# Patient Record
Sex: MALE | Race: WHITE | Employment: UNEMPLOYED | URBAN - METROPOLITAN AREA
[De-identification: names, ages, dates, MRNs, and addresses within clinical notes are randomized per-mention and may not be internally consistent; named-entity substitution may affect disease eponyms.]

---

## 2017-06-24 ENCOUNTER — HOSPITAL ENCOUNTER (EMERGENCY)
Age: 37
Discharge: LEFT BEFORE DISCHARGE | End: 2017-06-24
Attending: EMERGENCY MEDICINE
Payer: SELF-PAY

## 2017-06-24 ENCOUNTER — APPOINTMENT (OUTPATIENT)
Dept: GENERAL RADIOLOGY | Age: 37
End: 2017-06-24
Attending: PHYSICIAN ASSISTANT
Payer: SELF-PAY

## 2017-06-24 VITALS
SYSTOLIC BLOOD PRESSURE: 134 MMHG | BODY MASS INDEX: 21.19 KG/M2 | RESPIRATION RATE: 20 BRPM | TEMPERATURE: 98.3 F | WEIGHT: 135 LBS | DIASTOLIC BLOOD PRESSURE: 82 MMHG | HEART RATE: 95 BPM | OXYGEN SATURATION: 100 % | HEIGHT: 67 IN

## 2017-06-24 DIAGNOSIS — R52 BODY ACHES: ICD-10-CM

## 2017-06-24 DIAGNOSIS — R55 NEAR SYNCOPE: ICD-10-CM

## 2017-06-24 DIAGNOSIS — Z53.29 LEFT AGAINST MEDICAL ADVICE: ICD-10-CM

## 2017-06-24 DIAGNOSIS — S51.811A FOREARM LACERATION, RIGHT, INITIAL ENCOUNTER: ICD-10-CM

## 2017-06-24 DIAGNOSIS — T14.8XXA POSTTRAUMATIC WOUND INFECTION: ICD-10-CM

## 2017-06-24 DIAGNOSIS — R11.0 NAUSEA WITHOUT VOMITING: ICD-10-CM

## 2017-06-24 DIAGNOSIS — R07.9 CHEST PAIN, UNSPECIFIED TYPE: ICD-10-CM

## 2017-06-24 DIAGNOSIS — R51.9 HEADACHE, UNSPECIFIED HEADACHE TYPE: Primary | ICD-10-CM

## 2017-06-24 DIAGNOSIS — L08.9 POSTTRAUMATIC WOUND INFECTION: ICD-10-CM

## 2017-06-24 LAB
ALBUMIN SERPL BCP-MCNC: 4 G/DL (ref 3.4–5)
ALBUMIN/GLOB SERPL: 1.1 {RATIO} (ref 0.8–1.7)
ALP SERPL-CCNC: 74 U/L (ref 45–117)
ALT SERPL-CCNC: 30 U/L (ref 16–61)
AMPHET UR QL SCN: NEGATIVE
ANION GAP BLD CALC-SCNC: 8 MMOL/L (ref 3–18)
APAP SERPL-MCNC: <2 UG/ML (ref 10–30)
APPEARANCE UR: CLEAR
AST SERPL W P-5'-P-CCNC: 26 U/L (ref 15–37)
ATRIAL RATE: 83 BPM
BACTERIA URNS QL MICRO: ABNORMAL /HPF
BARBITURATES UR QL SCN: NEGATIVE
BASOPHILS # BLD AUTO: 0 K/UL (ref 0–0.06)
BASOPHILS # BLD: 0 % (ref 0–2)
BENZODIAZ UR QL: NEGATIVE
BILIRUB SERPL-MCNC: 0.4 MG/DL (ref 0.2–1)
BILIRUB UR QL: NEGATIVE
BUN SERPL-MCNC: 15 MG/DL (ref 7–18)
BUN/CREAT SERPL: 17 (ref 12–20)
CALCIUM SERPL-MCNC: 8.7 MG/DL (ref 8.5–10.1)
CALCULATED P AXIS, ECG09: 75 DEGREES
CALCULATED R AXIS, ECG10: 86 DEGREES
CALCULATED T AXIS, ECG11: 68 DEGREES
CANNABINOIDS UR QL SCN: POSITIVE
CHLORIDE SERPL-SCNC: 97 MMOL/L (ref 100–108)
CK MB CFR SERPL CALC: 1 % (ref 0–4)
CK MB SERPL-MCNC: 4 NG/ML (ref 5–25)
CK SERPL-CCNC: 393 U/L (ref 39–308)
CO2 SERPL-SCNC: 31 MMOL/L (ref 21–32)
COCAINE UR QL SCN: NEGATIVE
COLOR UR: YELLOW
CREAT SERPL-MCNC: 0.86 MG/DL (ref 0.6–1.3)
DIAGNOSIS, 93000: NORMAL
DIFFERENTIAL METHOD BLD: ABNORMAL
EOSINOPHIL # BLD: 0.2 K/UL (ref 0–0.4)
EOSINOPHIL NFR BLD: 1 % (ref 0–5)
EPITH CASTS URNS QL MICRO: ABNORMAL /LPF (ref 0–5)
ERYTHROCYTE [DISTWIDTH] IN BLOOD BY AUTOMATED COUNT: 12.7 % (ref 11.6–14.5)
ETHANOL SERPL-MCNC: <3 MG/DL (ref 0–3)
GLOBULIN SER CALC-MCNC: 3.5 G/DL (ref 2–4)
GLUCOSE SERPL-MCNC: 107 MG/DL (ref 74–99)
GLUCOSE UR STRIP.AUTO-MCNC: NEGATIVE MG/DL
HCT VFR BLD AUTO: 40.2 % (ref 36–48)
HDSCOM,HDSCOM: ABNORMAL
HGB BLD-MCNC: 14 G/DL (ref 13–16)
HGB UR QL STRIP: NEGATIVE
KETONES UR QL STRIP.AUTO: ABNORMAL MG/DL
LEUKOCYTE ESTERASE UR QL STRIP.AUTO: NEGATIVE
LYMPHOCYTES # BLD AUTO: 19 % (ref 21–52)
LYMPHOCYTES # BLD: 2.2 K/UL (ref 0.9–3.6)
MAGNESIUM SERPL-MCNC: 1.8 MG/DL (ref 1.6–2.6)
MCH RBC QN AUTO: 32 PG (ref 24–34)
MCHC RBC AUTO-ENTMCNC: 34.8 G/DL (ref 31–37)
MCV RBC AUTO: 91.8 FL (ref 74–97)
METHADONE UR QL: NEGATIVE
MONOCYTES # BLD: 0.9 K/UL (ref 0.05–1.2)
MONOCYTES NFR BLD AUTO: 7 % (ref 3–10)
NEUTS SEG # BLD: 8.3 K/UL (ref 1.8–8)
NEUTS SEG NFR BLD AUTO: 73 % (ref 40–73)
NITRITE UR QL STRIP.AUTO: NEGATIVE
OPIATES UR QL: POSITIVE
P-R INTERVAL, ECG05: 144 MS
PCP UR QL: NEGATIVE
PH UR STRIP: >8.5 [PH] (ref 5–8)
PLATELET # BLD AUTO: 238 K/UL (ref 135–420)
PMV BLD AUTO: 10.1 FL (ref 9.2–11.8)
POTASSIUM SERPL-SCNC: 3.7 MMOL/L (ref 3.5–5.5)
PROT SERPL-MCNC: 7.5 G/DL (ref 6.4–8.2)
PROT UR STRIP-MCNC: ABNORMAL MG/DL
Q-T INTERVAL, ECG07: 386 MS
QRS DURATION, ECG06: 100 MS
QTC CALCULATION (BEZET), ECG08: 453 MS
RBC # BLD AUTO: 4.38 M/UL (ref 4.7–5.5)
RBC #/AREA URNS HPF: ABNORMAL /HPF (ref 0–5)
SALICYLATES SERPL-MCNC: <2.8 MG/DL (ref 2.8–20)
SODIUM SERPL-SCNC: 136 MMOL/L (ref 136–145)
SP GR UR REFRACTOMETRY: 1.02 (ref 1–1.03)
TROPONIN I SERPL-MCNC: <0.02 NG/ML (ref 0–0.04)
UROBILINOGEN UR QL STRIP.AUTO: 1 EU/DL (ref 0.2–1)
VENTRICULAR RATE, ECG03: 83 BPM
WBC # BLD AUTO: 11.5 K/UL (ref 4.6–13.2)
WBC URNS QL MICRO: ABNORMAL /HPF (ref 0–4)

## 2017-06-24 PROCEDURE — 80307 DRUG TEST PRSMV CHEM ANLYZR: CPT | Performed by: PHYSICIAN ASSISTANT

## 2017-06-24 PROCEDURE — 96361 HYDRATE IV INFUSION ADD-ON: CPT

## 2017-06-24 PROCEDURE — 85025 COMPLETE CBC W/AUTO DIFF WBC: CPT | Performed by: PHYSICIAN ASSISTANT

## 2017-06-24 PROCEDURE — 74011250636 HC RX REV CODE- 250/636: Performed by: PHYSICIAN ASSISTANT

## 2017-06-24 PROCEDURE — 96360 HYDRATION IV INFUSION INIT: CPT

## 2017-06-24 PROCEDURE — 83735 ASSAY OF MAGNESIUM: CPT | Performed by: PHYSICIAN ASSISTANT

## 2017-06-24 PROCEDURE — 99283 EMERGENCY DEPT VISIT LOW MDM: CPT

## 2017-06-24 PROCEDURE — 90715 TDAP VACCINE 7 YRS/> IM: CPT | Performed by: PHYSICIAN ASSISTANT

## 2017-06-24 PROCEDURE — 71020 XR CHEST PA LAT: CPT

## 2017-06-24 PROCEDURE — 81001 URINALYSIS AUTO W/SCOPE: CPT | Performed by: PHYSICIAN ASSISTANT

## 2017-06-24 PROCEDURE — 82550 ASSAY OF CK (CPK): CPT | Performed by: PHYSICIAN ASSISTANT

## 2017-06-24 PROCEDURE — 93005 ELECTROCARDIOGRAM TRACING: CPT

## 2017-06-24 PROCEDURE — 80053 COMPREHEN METABOLIC PANEL: CPT | Performed by: PHYSICIAN ASSISTANT

## 2017-06-24 PROCEDURE — 36415 COLL VENOUS BLD VENIPUNCTURE: CPT | Performed by: PHYSICIAN ASSISTANT

## 2017-06-24 RX ADMIN — SODIUM CHLORIDE 1000 ML: 900 INJECTION, SOLUTION INTRAVENOUS at 11:59

## 2017-06-24 RX ADMIN — SODIUM CHLORIDE 1000 ML: 900 INJECTION, SOLUTION INTRAVENOUS at 10:47

## 2017-06-24 RX ADMIN — TETANUS TOXOID, REDUCED DIPHTHERIA TOXOID AND ACELLULAR PERTUSSIS VACCINE, ADSORBED 0.5 ML: 5; 2.5; 8; 8; 2.5 SUSPENSION INTRAMUSCULAR at 11:58

## 2017-06-24 NOTE — ED PROVIDER NOTES
HPI Comments: Minoo Rogel is a  43 y.o. Normal build white male smoker h/o presents to the ED via POV c/o Generalized Body Aches, Weakness, Fatigue, intermittent bilateral CP, non-radiating, occurs at rest x 2 days, dull aching frontal headache (woke up this morning with this), nausea without vomiting. Also notes ST, dizziness, feeling faint. He says he's been in the sun a lot and thinks he's dehydrated. Finally, admits to cutting accidentally to the right forearm a few days ago. Denies fever, dizziness/LOC, double vision, blurry vision, eye pain, sinus pain/congestion, ear pain, difficulty swallowing, choking sensation, neck pain/stiffness/swelling, palpitations, nicole, orthopnea, calf pain/swelling, abd pain, vomiting, diarrhea, brbpr, melena, flank pain, blood in urine, decreased urination, difficulty urinating, penile d/c, penile/scrotal pain/swelling, back pain, extremity weakness/numbness/tingling. Admits IVDA, ETOH. Denies Injury or LOC. Last Td unknown         Patient is a 43 y.o. male presenting with muscle spasms. Spasms   Associated symptoms include chest pain and headaches. Pertinent negatives include no abdominal pain and no shortness of breath. Past Medical History:   Diagnosis Date    ADHD (attention deficit hyperactivity disorder)     Anxiety     Bipolar disorder ([facility] [address]     ETOH abuse        History reviewed. No pertinent surgical history. History reviewed. No pertinent family history. Social History     Social History    Marital status: SINGLE     Spouse name: N/A    Number of children: N/A    Years of education: N/A     Occupational History    Not on file. Social History Main Topics    Smoking status: Current Every Day Smoker    Smokeless tobacco: Never Used    Alcohol use Yes    Drug use:  Yes    Sexual activity: Yes     Partners: Female     Other Topics Concern    Not on file     Social History Narrative    No narrative on file         ALLERGIES: Review of patient's allergies indicates no known allergies. Review of Systems   Constitutional: Positive for chills and fatigue. Negative for diaphoresis and fever. HENT: Positive for sore throat. Negative for congestion, dental problem, drooling, ear discharge, ear pain, mouth sores, rhinorrhea, sinus pressure, trouble swallowing and voice change. Eyes: Negative for photophobia, pain and visual disturbance. Respiratory: Negative for cough and shortness of breath. Cardiovascular: Positive for chest pain. Negative for leg swelling. Gastrointestinal: Positive for nausea. Negative for abdominal pain, blood in stool, diarrhea and vomiting. Genitourinary: Negative for decreased urine volume, difficulty urinating, dysuria, flank pain, frequency, hematuria and urgency. Musculoskeletal: Positive for arthralgias, muscle spasms and myalgias. Negative for gait problem, neck pain and neck stiffness. Generalized Body Aches    Skin: Positive for wound (R Forearm). Negative for color change, pallor and rash. Neurological: Positive for dizziness, weakness, light-headedness, numbness (BUE/BLE ) and headaches. Negative for seizures, syncope, facial asymmetry and speech difficulty. Vitals:    06/24/17 0955 06/24/17 1007   BP: 134/82    Pulse: 95    Resp: 20    Temp: 98.3 °F (36.8 °C)    SpO2: 100% 100%   Weight: 61.2 kg (135 lb)    Height: 5' 7\" (1.702 m)             Physical Exam   Constitutional: He is oriented to person, place, and time. Vital signs are normal. He appears well-developed and well-nourished. He is active and cooperative. Non-toxic appearance. He does not have a sickly appearance. He does not appear ill. No distress. HENT:   Head: Normocephalic and atraumatic. Right Ear: Tympanic membrane, external ear and ear canal normal. No mastoid tenderness. Tympanic membrane is not injected, not erythematous and not bulging. No middle ear effusion. No hemotympanum.    Left Ear: Tympanic membrane, external ear and ear canal normal. No mastoid tenderness. Tympanic membrane is not injected, not erythematous and not bulging. No middle ear effusion. No hemotympanum. Nose: Nose normal. No mucosal edema or rhinorrhea. Right sinus exhibits no maxillary sinus tenderness and no frontal sinus tenderness. Left sinus exhibits no maxillary sinus tenderness and no frontal sinus tenderness. Mouth/Throat: Uvula is midline, oropharynx is clear and moist and mucous membranes are normal. No oral lesions. No trismus in the jaw. No dental abscesses, uvula swelling or lacerations. No oropharyngeal exudate, posterior oropharyngeal edema, posterior oropharyngeal erythema or tonsillar abscesses. Uvula is midline. No erythema or exudate. Tonsils flat Symmetric w/o PTA. Tolerating secretions. No elevation of the tongue. Phonation normal. Airway patent. Eyes: Conjunctivae, EOM and lids are normal. Pupils are equal, round, and reactive to light. Right eye exhibits no discharge. Left eye exhibits no discharge. No scleral icterus. Right eye exhibits normal extraocular motion and no nystagmus. Left eye exhibits normal extraocular motion and no nystagmus. Right pupil is round and reactive. Left pupil is round and reactive. Pupils are equal.   Fundoscopic exam:       The right eye shows no papilledema. The left eye shows no papilledema. Neck: Trachea normal, normal range of motion, full passive range of motion without pain and phonation normal. Neck supple. No tracheal tenderness, no spinous process tenderness and no muscular tenderness present. No rigidity. No tracheal deviation, no edema, no erythema and normal range of motion present. No Brudzinski's sign and no Kernig's sign noted. No thyroid mass and no thyromegaly present. Supple, Symmetric Neck w/o LAD. No stridor. Phonation Normal.     Negative Kernigs, Brudzinski's Sign. Cardiovascular: Normal rate, regular rhythm, normal heart sounds and intact distal pulses. Exam reveals no gallop and no friction rub. No murmur heard. Pulmonary/Chest: Effort normal and breath sounds normal. No accessory muscle usage or stridor. No tachypnea. No respiratory distress. He has no decreased breath sounds. He has no wheezes. He has no rhonchi. He has no rales. He exhibits no tenderness and no bony tenderness. Symmetric rise/fall of the chest wall. Speaks in full sentences. Great inspiratory effort. No calf edema/swelling or TTP. Abdominal: Soft. Normal appearance and bowel sounds are normal. He exhibits no distension. There is no tenderness. There is no rigidity, no rebound, no guarding and no CVA tenderness. Musculoskeletal:        Right shoulder: Normal.        Right elbow: Normal.He exhibits normal range of motion, no swelling, no effusion, no deformity and no laceration. No tenderness found. No radial head, no medial epicondyle, no lateral epicondyle and no olecranon process tenderness noted. Right wrist: Normal. He exhibits normal range of motion, no tenderness, no bony tenderness, no swelling, no effusion, no crepitus, no deformity and no laceration. Right upper arm: Normal.        Right forearm: He exhibits tenderness and swelling. He exhibits no bony tenderness, no edema and no deformity. Left forearm: Normal.        Right hand: Normal. He exhibits normal range of motion, no tenderness, no bony tenderness, normal two-point discrimination, normal capillary refill, no deformity, no laceration and no swelling. Normal sensation noted. Decreased sensation is not present in the ulnar distribution, is not present in the medial distribution and is not present in the radial distribution. Normal strength noted. He exhibits no finger abduction, no thumb/finger opposition and no wrist extension trouble. Left hand: Normal.        Right lower leg: Normal. He exhibits no tenderness, no bony tenderness, no swelling, no edema, no deformity and no laceration. Left lower leg: Normal. He exhibits no tenderness, no bony tenderness, no swelling, no edema, no deformity and no laceration. Brachial/Radial Pulses intact/equal BUE. Cap refill < 2 sec BUE. FROM @ RUE Shoulder/Elbow/Forearm/Wrist/Hand. Sensation Intact BUE. Mild erythema/swelling w/o fluctuance to a < 2 cm lac to the RUE forearm w/o pus/drainage without surrounding erythema. No involvement/extension of erythema/swelling to the elbow or wrist.      Compartments Soft RUE. MS 5/5 BUE. Lymphadenopathy:     He has no cervical adenopathy. Neurological: He is alert and oriented to person, place, and time. He has normal strength. He is not disoriented. No cranial nerve deficit or sensory deficit. Normal Gait. MS 5/5 BUE/BLE. Speech Clear. A&O x 3. No focal neuro deficits. CN 2-12 intact. Normal Finger-To-Nose. Skin: Skin is warm and dry. Laceration noted. No bruising, no ecchymosis and no rash noted. He is not diaphoretic. There is erythema. No cyanosis. No pallor. R Forearm - Small < 2 cm well approximated lac without pus/drainage, no fluctuance, w/ surrounding erythema. Nursing note and vitals reviewed.        MDM  Number of Diagnoses or Management Options  Body aches: new and requires workup  Chest pain, unspecified type: new and requires workup  Forearm laceration, right, initial encounter: new and requires workup  Headache, unspecified headache type: new and requires workup  Left against medical advice: new and requires workup  Nausea without vomiting: new and requires workup  Near syncope: new and requires workup  Posttraumatic wound infection: new and requires workup  Diagnosis management comments: DDX: Migraine, Tension, Cluster, ICH, Cervical sprain, Cervical strain, Skull Fracture, CVA, TIA, Concussion with or without LOC, CHI, Skull Contusion, Neoplasm    DDX: Vestibular disorder of the inner ear, Cardiac Arrhythmia, Pulmonary Embolism, TIA, CVA, Seizures, Dehydration, Aortic Stenosis, Hypoglycemia, Subarachnoid hemorrhage, Hemorrhage (GI, Ectopic pregnancy), Adrenal insufficiency, Sepsis, Hypotension. DDX: Chest pain, Atypical CP, Musculoskeletal CP, Chest wall contusion, Pleuritis, Pleurisy, ACS, Pneumothorax, Pneumonia, Bronchitis, Pleurodynia, Pericarditis, Pleural Effusion, Atelectasis, Pericardial Effusion, CHF, Gastro-esophageal spasm, Esophagitis, Gastritis, Rib fracture, Costochondritis. DDX: Abdominal pain, Pancreatitis, Gastritis, AMI, Gastroenteritis, Colitis, Diverticulitis, PUD, Cholecystitis, Choledocholithiasis, Mesenteric Ischemia, Acute Cystitis, Pyelonephritis, Renal Colic, Biliary Colic, Perforation, Splenic Flexure Syndrome, Abdominal Aortic Aneurysm, Gastrointestinal Bleed. ED EKG interpretation:  Rhythm: normal sinus rhythm;  Vent rate: 83 bpm; FL Interval: 144 ms; QRS duration: 100 ms; QT/QTc: 386/453 ms; P-R-T axes: 75 86 68. Other findings: normal. EKG was completed at 1052. This EKG was reviewed by Dr. Aravind Lomas (ED attending) at [phone]. Lac to right forearm a couple days old surrounded by erythema localized to forearm only not extending to adjacent wrist/elbow. 11:45 AM: CXR Result: Normal Study. 1:30 PM  Pt expresses wish to leave the emergency department against medical advice (AMA). States he has to catch his ride. Feels mAdverse problems related to this decision including bodily harm and death have been discussed with the patient and/or family. The proposed treatment was reviewed and the patient stated understanding. Alternative treatment was discussed and the patient stated understanding. The patient and/or family do not appear intoxicated and appear to be capable of understanding and making a decision of such gravity. The physician has discussed the benefits reasonably expected from offered services are:  further observation, evaluation and treatment of current symptoms.   The risks of refusing treatment; including the potential of death and/or permanent disability, was discussed and the patient stated understanding. The patient and/or family express understanding of the possible adverse outcomes of their decision and still express the wish to leave against medical advice. The patient and/or family were given follow up and return instructions and the available laboratory tests and x-rays were discussed. The patient and/or family were given the opportunity to ask questions. The patient and/or family agree to follow up with a physician of their choice as soon as possible or to return to the ER at any time to finish the work-up. Amount and/or Complexity of Data Reviewed  Clinical lab tests: reviewed and ordered  Tests in the radiology section of CPT®: ordered and reviewed  Tests in the medicine section of CPT®: reviewed and ordered  Discussion of test results with the performing providers: yes  Decide to obtain previous medical records or to obtain history from someone other than the patient: yes  Review and summarize past medical records: yes  Independent visualization of images, tracings, or specimens: yes    Risk of Complications, Morbidity, and/or Mortality  Presenting problems: moderate  Diagnostic procedures: moderate  Management options: moderate    Patient Progress  Patient progress: stable      Procedures  Diagnosis:   1. Headache, unspecified headache type    2. Near syncope    3. Chest pain, unspecified type    4. Nausea without vomiting    5. Body aches    6. Forearm laceration, right, initial encounter    7. Posttraumatic wound infection    8. Left against medical advice          Disposition: AMA    Follow-up Information     None          There are no discharge medications for this patient. No results found for this or any previous visit (from the past 24 hour(s)).